# Patient Record
(demographics unavailable — no encounter records)

---

## 2025-06-18 NOTE — REVIEW OF SYSTEMS
[Hoarseness] : hoarseness [Abdominal Pain] : abdominal pain [Diarrhea] : diarrhea [Heartburn] : heartburn [Arthralgias (joint pain)] : arthralgias [Negative] : Heme/Lymph